# Patient Record
Sex: FEMALE | URBAN - METROPOLITAN AREA
[De-identification: names, ages, dates, MRNs, and addresses within clinical notes are randomized per-mention and may not be internally consistent; named-entity substitution may affect disease eponyms.]

---

## 2024-11-28 ENCOUNTER — OFFICE VISIT (OUTPATIENT)
Dept: URGENT CARE | Age: 24
End: 2024-11-28
Payer: COMMERCIAL

## 2024-11-28 VITALS
RESPIRATION RATE: 16 BRPM | HEART RATE: 72 BPM | SYSTOLIC BLOOD PRESSURE: 134 MMHG | OXYGEN SATURATION: 98 % | DIASTOLIC BLOOD PRESSURE: 87 MMHG | BODY MASS INDEX: 21.48 KG/M2 | WEIGHT: 121.25 LBS | TEMPERATURE: 97.7 F | HEIGHT: 63 IN
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
## 2024-11-28 DIAGNOSIS — S70.361A TICK BITE OF RIGHT THIGH, INITIAL ENCOUNTER: Primary | ICD-10-CM

## 2024-11-28 ASSESSMENT — ENCOUNTER SYMPTOMS
WOUND: 1
ROS SKIN COMMENTS: SEE HPI

## 2024-11-28 ASSESSMENT — PAIN SCALES - GENERAL: PAINLEVEL_OUTOF10: 6

## 2024-11-28 NOTE — PATIENT INSTRUCTIONS
"For the next few weeks monitor for:  -A rash where you were bitten - The rash often appears within a month of getting bitten. It is red, but its center can be the color of your skin. It might get bigger over a few days. To some, it looks like a \"bull's eye\" (picture 1).  -Fever  -Body aches and pains  -Heart problems such as a slowed heart rate  -Headache and stiff neck  -Feelings of pain, weakness, or numbness    If you develop any of these symptoms return to clinic or follow up with your PCP. Be sure to tell them you were bitten by a tick.     Monitor the bite site for signs of infection (redness, swelling, drainage, or increased pain)  You can apply a ice and use NSAIDS PRN for pain.   You can use an anti-itch cream to help with itching.   "

## 2024-11-28 NOTE — PROGRESS NOTES
"Subjective   Patient ID: Niyah Leo is a 24 y.o. female. They present today with a chief complaint of Other (Pt. Stated was bit by a tick this morning and believes some of tick still in skin and the site is at right upper thigh and painful.).    History of Present Illness  -c/o tick bite to right upper thigh  -this morning she felt something bite her thigh, and she found a tick, which she was able to remove.  -she brought the tick with her in a bag to the clinic  -area still sore and painful to the touch  -wanted to be sure she removed the whole tick  -tick could not have been attached more than a few hours  -she is visiting from [place]  -she denies fever, chills, rash  -denies other complaints           Past Medical History  Allergies as of 11/28/2024    (No Known Allergies)       (Not in a hospital admission)       No past medical history on file.    No past surgical history on file.     reports that she has never smoked. She has been exposed to tobacco smoke. She has never used smokeless tobacco. Alcohol use questions deferred to the physician. Drug use questions deferred to the physician.    Review of Systems  Review of Systems   Skin:  Positive for wound.        SEE HPI   All other systems reviewed and are negative.    Objective    Vitals:    11/28/24 1328   BP: 134/87   BP Location: Right arm   Patient Position: Sitting   BP Cuff Size: Adult   Pulse: 72   Resp: 16   Temp: 36.5 °C (97.7 °F)   TempSrc: Oral   SpO2: 98%   Weight: 55 kg (121 lb 4.1 oz)   Height: 1.6 m (5' 2.99\")     Patient's last menstrual period was 11/14/2024.    Physical Exam  Vitals reviewed.   Constitutional:       General: She is not in acute distress.     Appearance: Normal appearance. She is not ill-appearing, toxic-appearing or diaphoretic.   Skin:     General: Skin is warm and dry.          Neurological:      Mental Status: She is alert.       /87 (BP Location: Right arm, Patient Position: Sitting, BP Cuff Size: Adult)   Pulse 72  " " Temp 36.5 °C (97.7 °F) (Oral)   Resp 16   Ht 1.6 m (5' 2.99\")   Wt 55 kg (121 lb 4.1 oz)   LMP 11/14/2024   SpO2 98%   BMI 21.48 kg/m²     Procedures    Point of Care Test & Imaging Results from this visit  No results found for this visit on 11/28/24.   No results found.    Diagnostic study results (if any) were reviewed by Suzanna Cole PA-C.    Assessment/Plan   Allergies, medications, history, and pertinent labs/EKGs/Imaging reviewed by Suzanna Cole PA-C.     Medical Decision Making  Patient presented with tick bite to the left posterior thigh.  Patient brought tick with her to the clinic.  Tick body appears to intact with all legs, and head attached.. Examination of bite site did not show any evidence of retained foreign body.   Tick was only attached for a few hours, and therefore the chances of kinza Lyme's disease is low. No indication to treat ppx for Lymes.  Advised patient to monitor for fever, chills, joint pain, rash, muscle pain, headache, and other flu-like symptoms for the next 2 weeks, and to return to clinic or follow up should she develop these symptoms. She was advised to use ice pack and PRN NSAIDS for any discomfort, and monitor site for signs of infection.       At time of discharge patient was clinically well-appearing and HDS for outpatient management. The patient and/or family was educated regarding diagnosis, supportive care, OTC and Rx medications. The patient and/or family was given the opportunity to ask questions prior to discharge.  They verbalized understanding of my discussion of the plans for treatment, expected course, indications to return to  or seek further evaluation in ED, and the need for timely follow up as directed.   They were provided with a work/school excuse if requested.    Orders and Diagnoses  Diagnoses and all orders for this visit:  Tick bite of right thigh, initial encounter      Medical Admin Record      Patient disposition: " Home    Electronically signed by Suzanna Cole PA-C  1:50 PM